# Patient Record
Sex: MALE | Race: WHITE | Employment: UNEMPLOYED | URBAN - METROPOLITAN AREA
[De-identification: names, ages, dates, MRNs, and addresses within clinical notes are randomized per-mention and may not be internally consistent; named-entity substitution may affect disease eponyms.]

---

## 2019-10-26 ENCOUNTER — HOSPITAL ENCOUNTER (EMERGENCY)
Age: 49
Discharge: HOME | End: 2019-10-26
Attending: EMERGENCY MEDICINE
Payer: MEDICARE

## 2019-10-26 VITALS
HEART RATE: 80 BPM | WEIGHT: 249 LBS | SYSTOLIC BLOOD PRESSURE: 129 MMHG | TEMPERATURE: 98.9 F | HEIGHT: 66 IN | DIASTOLIC BLOOD PRESSURE: 79 MMHG | BODY MASS INDEX: 40.02 KG/M2 | OXYGEN SATURATION: 96 % | RESPIRATION RATE: 16 BRPM

## 2019-10-26 DIAGNOSIS — R04.0 EPISTAXIS: ICD-10-CM

## 2019-10-26 DIAGNOSIS — R51.9 ACUTE NONINTRACTABLE HEADACHE, UNSPECIFIED HEADACHE TYPE: Primary | ICD-10-CM

## 2019-10-26 LAB
ALBUMIN SERPL-MCNC: 3.7 G/DL (ref 3.5–5)
ALBUMIN/GLOB SERPL: 0.9 {RATIO} (ref 1.2–3.5)
ALP SERPL-CCNC: 118 U/L (ref 50–136)
ALT SERPL-CCNC: 52 U/L (ref 12–65)
ANION GAP SERPL CALC-SCNC: 9 MMOL/L (ref 7–16)
AST SERPL-CCNC: 52 U/L (ref 15–37)
BASOPHILS # BLD: 0.2 K/UL (ref 0–0.2)
BASOPHILS NFR BLD: 1 % (ref 0–2)
BILIRUB SERPL-MCNC: 0.6 MG/DL (ref 0.2–1.1)
BUN SERPL-MCNC: 8 MG/DL (ref 6–23)
CALCIUM SERPL-MCNC: 9.4 MG/DL (ref 8.3–10.4)
CHLORIDE SERPL-SCNC: 106 MMOL/L (ref 98–107)
CO2 SERPL-SCNC: 25 MMOL/L (ref 21–32)
CREAT SERPL-MCNC: 1.19 MG/DL (ref 0.8–1.5)
DIFFERENTIAL METHOD BLD: ABNORMAL
EOSINOPHIL # BLD: 0.6 K/UL (ref 0–0.8)
EOSINOPHIL NFR BLD: 4 % (ref 0.5–7.8)
ERYTHROCYTE [DISTWIDTH] IN BLOOD BY AUTOMATED COUNT: 13.9 % (ref 11.9–14.6)
GLOBULIN SER CALC-MCNC: 4.3 G/DL (ref 2.3–3.5)
GLUCOSE SERPL-MCNC: 76 MG/DL (ref 65–100)
HCT VFR BLD AUTO: 47.5 % (ref 41.1–50.3)
HGB BLD-MCNC: 15.2 G/DL (ref 13.6–17.2)
IMM GRANULOCYTES # BLD AUTO: 0.1 K/UL (ref 0–0.5)
IMM GRANULOCYTES NFR BLD AUTO: 1 % (ref 0–5)
LYMPHOCYTES # BLD: 3.9 K/UL (ref 0.5–4.6)
LYMPHOCYTES NFR BLD: 25 % (ref 13–44)
MCH RBC QN AUTO: 29.3 PG (ref 26.1–32.9)
MCHC RBC AUTO-ENTMCNC: 32 G/DL (ref 31.4–35)
MCV RBC AUTO: 91.5 FL (ref 79.6–97.8)
MONOCYTES # BLD: 1.1 K/UL (ref 0.1–1.3)
MONOCYTES NFR BLD: 7 % (ref 4–12)
NEUTS SEG # BLD: 9.6 K/UL (ref 1.7–8.2)
NEUTS SEG NFR BLD: 62 % (ref 43–78)
NRBC # BLD: 0 K/UL (ref 0–0.2)
PLATELET # BLD AUTO: 348 K/UL (ref 150–450)
PMV BLD AUTO: 9.3 FL (ref 9.4–12.3)
POTASSIUM SERPL-SCNC: 3.8 MMOL/L (ref 3.5–5.1)
PROT SERPL-MCNC: 8 G/DL (ref 6.3–8.2)
RBC # BLD AUTO: 5.19 M/UL (ref 4.23–5.6)
SODIUM SERPL-SCNC: 140 MMOL/L (ref 136–145)
WBC # BLD AUTO: 15.4 K/UL (ref 4.3–11.1)

## 2019-10-26 PROCEDURE — 85025 COMPLETE CBC W/AUTO DIFF WBC: CPT

## 2019-10-26 PROCEDURE — 99284 EMERGENCY DEPT VISIT MOD MDM: CPT | Performed by: EMERGENCY MEDICINE

## 2019-10-26 PROCEDURE — 96361 HYDRATE IV INFUSION ADD-ON: CPT | Performed by: EMERGENCY MEDICINE

## 2019-10-26 PROCEDURE — 96375 TX/PRO/DX INJ NEW DRUG ADDON: CPT | Performed by: EMERGENCY MEDICINE

## 2019-10-26 PROCEDURE — 74011250636 HC RX REV CODE- 250/636: Performed by: EMERGENCY MEDICINE

## 2019-10-26 PROCEDURE — 96374 THER/PROPH/DIAG INJ IV PUSH: CPT | Performed by: EMERGENCY MEDICINE

## 2019-10-26 PROCEDURE — 80053 COMPREHEN METABOLIC PANEL: CPT

## 2019-10-26 RX ORDER — ONDANSETRON 2 MG/ML
4 INJECTION INTRAMUSCULAR; INTRAVENOUS
Status: COMPLETED | OUTPATIENT
Start: 2019-10-26 | End: 2019-10-26

## 2019-10-26 RX ORDER — MORPHINE SULFATE 4 MG/ML
4 INJECTION INTRAVENOUS
Status: COMPLETED | OUTPATIENT
Start: 2019-10-26 | End: 2019-10-26

## 2019-10-26 RX ADMIN — MORPHINE SULFATE 4 MG: 4 INJECTION INTRAVENOUS at 19:37

## 2019-10-26 RX ADMIN — SODIUM CHLORIDE 1000 ML: 900 INJECTION, SOLUTION INTRAVENOUS at 19:37

## 2019-10-26 RX ADMIN — ONDANSETRON 4 MG: 2 INJECTION INTRAMUSCULAR; INTRAVENOUS at 19:37

## 2019-10-26 NOTE — ED PROVIDER NOTES
Patient is on Brilinta for his heart. Developed intermittent nosebleeds since 8 AM this morning. Kind of a slow drizzled per him. When the bleeding stops he develops a diffuse frontal headache. When the bleeding starts again headache improves. For the past couple days he has had some dizziness and lightheadedness with a fall. Injured his right chest wall. Had EKG and CT at that time with no acute found. Since today with the bleeding and headache. Note form [facility] follows. Harrison Harper MD - 10/25/2019 2:57 AM EDT  Formatting of this note might be different from the original.  History  Chief Complaint   Patient presents with    Shortness of Breath   Pt sent to ED by PCP. Pt report a knot in his left upper chest and difficulty taking a deep breath. HPI  Patient is a 55-year-old gentleman with a history significant for panhypopituitarism status post pituitary tumor resection, prior coronary stenting on aspirin/Brilinta presenting today for evaluation of chest pain after a fall about 5 days ago. Patient states that he had woken up in the middle the night to use the restroom\" and the next thing I knew I woke up on the floor\". He thinks that he fell and struck the right anterior side of his chest on a dresser. Since, states that has been having dull, throbbing right anterior chest wall pain that is been roughly static. He endorses persistent shortness of breath during this time, particular worse with ambulation, somewhat better with rest. States that he feels like he is unable to take a deep breath. Yesterday states that he had a coughing spell and after noted a \"knot\" in the middle of ecchymosis from his fall. Notes that he is also been having daily posterior headaches after the fall with some associated light headedness, usually does not get headaches. Was referred to the ED by his PCP for further evaluation. Has noted some chills but has not taken his temperature at home.     CT chest and head no acute.     The history is provided by the patient. No  was used. Epistaxis    This is a new problem. The current episode started 6 to 12 hours ago. The problem occurs daily. The problem has been resolved. The problem is associated with nothing. The bleeding has been from the right nare. He has tried applying pressure for the symptoms. Headache    This is a new problem. The current episode started 6 to 12 hours ago. Episode frequency: daily. The problem has not changed since onset. The headache is aggravated by nothing. The pain is located in the bilateral and frontal region. The quality of the pain is described as throbbing. The pain is moderate. Pertinent negatives include no fever, no malaise/fatigue, no chest pressure, no palpitations, no shortness of breath, no weakness, no dizziness, no visual change, no nausea and no vomiting. He has tried nothing for the symptoms. No past medical history on file. No past surgical history on file. No family history on file.     Social History     Socioeconomic History    Marital status: SINGLE     Spouse name: Not on file    Number of children: Not on file    Years of education: Not on file    Highest education level: Not on file   Occupational History    Not on file   Social Needs    Financial resource strain: Not on file    Food insecurity:     Worry: Not on file     Inability: Not on file    Transportation needs:     Medical: Not on file     Non-medical: Not on file   Tobacco Use    Smoking status: Not on file   Substance and Sexual Activity    Alcohol use: Not on file    Drug use: Not on file    Sexual activity: Not on file   Lifestyle    Physical activity:     Days per week: Not on file     Minutes per session: Not on file    Stress: Not on file   Relationships    Social connections:     Talks on phone: Not on file     Gets together: Not on file     Attends Jainism service: Not on file     Active member of club or organization: Not on file     Attends meetings of clubs or organizations: Not on file     Relationship status: Not on file    Intimate partner violence:     Fear of current or ex partner: Not on file     Emotionally abused: Not on file     Physically abused: Not on file     Forced sexual activity: Not on file   Other Topics Concern    Not on file   Social History Narrative    Not on file         ALLERGIES: Patient has no known allergies. Review of Systems   Constitutional: Negative for chills, fever and malaise/fatigue. HENT: Positive for nosebleeds. Negative for rhinorrhea and sore throat. Eyes: Negative for pain and redness. Respiratory: Negative for chest tightness, shortness of breath and wheezing. Cardiovascular: Negative for chest pain, palpitations and leg swelling. Gastrointestinal: Negative for abdominal pain, diarrhea, nausea and vomiting. Genitourinary: Negative for dysuria and hematuria. Musculoskeletal: Negative for back pain, gait problem, neck pain and neck stiffness. Skin: Negative for color change and rash. Neurological: Positive for light-headedness and headaches. Negative for dizziness, weakness and numbness. Vitals:    10/26/19 1801   BP: 121/81   Pulse: 88   Resp: 16   Temp: 99.1 °F (37.3 °C)   SpO2: 95%   Weight: 112.9 kg (249 lb)   Height: 5' 6\" (1.676 m)            Physical Exam   Constitutional: He is oriented to person, place, and time. He appears well-developed and well-nourished. HENT:   Head: Normocephalic and atraumatic. Dried blood in right nostril more than left. No active bleeding. Eyes: Pupils are equal, round, and reactive to light. EOM are normal.   Neck: Normal range of motion. Neck supple. Cardiovascular: Normal rate and regular rhythm. Pulmonary/Chest: Effort normal and breath sounds normal.   Abdominal: Soft. Bowel sounds are normal. There is no tenderness. Musculoskeletal: Normal range of motion. He exhibits no edema. Neurological: He is alert and oriented to person, place, and time. No cranial nerve deficit. He exhibits normal muscle tone. Coordination normal.   Skin: Skin is warm and dry. MDM  Number of Diagnoses or Management Options  Diagnosis management comments: Nosebleed has stopped for his entire ER visit. Headache feels better after medication here. Will discharge with ENT follow-up. Amount and/or Complexity of Data Reviewed  Clinical lab tests: ordered and reviewed  Tests in the radiology section of CPT®: reviewed  Tests in the medicine section of CPT®: ordered and reviewed    Patient Progress  Patient progress: stable         Procedures        Results Include:    Recent Results (from the past 24 hour(s))   CBC WITH AUTOMATED DIFF    Collection Time: 10/26/19  6:09 PM   Result Value Ref Range    WBC 15.4 (H) 4.3 - 11.1 K/uL    RBC 5.19 4.23 - 5.6 M/uL    HGB 15.2 13.6 - 17.2 g/dL    HCT 47.5 41.1 - 50.3 %    MCV 91.5 79.6 - 97.8 FL    MCH 29.3 26.1 - 32.9 PG    MCHC 32.0 31.4 - 35.0 g/dL    RDW 13.9 11.9 - 14.6 %    PLATELET 722 881 - 435 K/uL    MPV 9.3 (L) 9.4 - 12.3 FL    ABSOLUTE NRBC 0.00 0.0 - 0.2 K/uL    DF AUTOMATED      NEUTROPHILS 62 43 - 78 %    LYMPHOCYTES 25 13 - 44 %    MONOCYTES 7 4.0 - 12.0 %    EOSINOPHILS 4 0.5 - 7.8 %    BASOPHILS 1 0.0 - 2.0 %    IMMATURE GRANULOCYTES 1 0.0 - 5.0 %    ABS. NEUTROPHILS 9.6 (H) 1.7 - 8.2 K/UL    ABS. LYMPHOCYTES 3.9 0.5 - 4.6 K/UL    ABS. MONOCYTES 1.1 0.1 - 1.3 K/UL    ABS. EOSINOPHILS 0.6 0.0 - 0.8 K/UL    ABS. BASOPHILS 0.2 0.0 - 0.2 K/UL    ABS. IMM.  GRANS. 0.1 0.0 - 0.5 K/UL   METABOLIC PANEL, COMPREHENSIVE    Collection Time: 10/26/19  7:52 PM   Result Value Ref Range    Sodium 140 136 - 145 mmol/L    Potassium 3.8 3.5 - 5.1 mmol/L    Chloride 106 98 - 107 mmol/L    CO2 25 21 - 32 mmol/L    Anion gap 9 7 - 16 mmol/L    Glucose 76 65 - 100 mg/dL    BUN 8 6 - 23 MG/DL    Creatinine 1.19 0.8 - 1.5 MG/DL    GFR est AA >60 >60 ml/min/1.73m2    GFR est non-AA >60 >60 ml/min/1.73m2    Calcium 9.4 8.3 - 10.4 MG/DL    Bilirubin, total 0.6 0.2 - 1.1 MG/DL    ALT (SGPT) 52 12 - 65 U/L    AST (SGOT) 52 (H) 15 - 37 U/L    Alk.  phosphatase 118 50 - 136 U/L    Protein, total 8.0 6.3 - 8.2 g/dL    Albumin 3.7 3.5 - 5.0 g/dL    Globulin 4.3 (H) 2.3 - 3.5 g/dL    A-G Ratio 0.9 (L) 1.2 - 3.5

## 2019-10-26 NOTE — ED TRIAGE NOTES
Pt arrives via EMS from home with c/o epistaxis intermitent since 0800 this morning. Bleeding controlled at this time. Pt reports HA when bleeding stops and HA goes away while his nose is bleeding. /86, HR 90, . Pt is on brelinta and ASA. Pt states the bleed is a slow bleed. Pt states he has been dizzy for a few days. Pt went to the ER at [facility] and states CT and EKG was unremarkable. Pt appears anxious in triage.

## 2019-10-27 NOTE — ED NOTES
I have reviewed discharge instructions with the patient. The patient verbalized understanding. Patient left ED via Discharge Method: ambulatory to Home with friend. Opportunity for questions and clarification provided. Patient given 0 scripts. To continue your aftercare when you leave the hospital, you may receive an automated call from our care team to check in on how you are doing. This is a free service and part of our promise to provide the best care and service to meet your aftercare needs.  If you have questions, or wish to unsubscribe from this service please call [phone]. Thank you for Choosing our [facility] Emergency Department.

## 2019-10-27 NOTE — DISCHARGE INSTRUCTIONS
Patient Education        Headache: Care Instructions  Your Care Instructions    Headaches have many possible causes. Most headaches aren't a sign of a more serious problem, and they will get better on their own. Home treatment may help you feel better faster. The doctor has checked you carefully, but problems can develop later. If you notice any problems or new symptoms, get medical treatment right away. Follow-up care is a key part of your treatment and safety. Be sure to make and go to all appointments, and call your doctor if you are having problems. It's also a good idea to know your test results and keep a list of the medicines you take. How can you care for yourself at home? · Do not drive if you have taken a prescription pain medicine. · Rest in a quiet, dark room until your headache is gone. Close your eyes and try to relax or go to sleep. Don't watch TV or read. · Put a cold, moist cloth or cold pack on the painful area for 10 to 20 minutes at a time. Put a thin cloth between the cold pack and your skin. · Use a warm, moist towel or a heating pad set on low to relax tight shoulder and neck muscles. · Have someone gently massage your neck and shoulders. · Take pain medicines exactly as directed. ? If the doctor gave you a prescription medicine for pain, take it as prescribed. ? If you are not taking a prescription pain medicine, ask your doctor if you can take an over-the-counter medicine. · Be careful not to take pain medicine more often than the instructions allow, because you may get worse or more frequent headaches when the medicine wears off. · Do not ignore new symptoms that occur with a headache, such as a fever, weakness or numbness, vision changes, or confusion. These may be signs of a more serious problem. To prevent headaches  · Keep a headache diary so you can figure out what triggers your headaches. Avoiding triggers may help you prevent headaches.  Record when each headache began, how long it lasted, and what the pain was like (throbbing, aching, stabbing, or dull). Write down any other symptoms you had with the headache, such as nausea, flashing lights or dark spots, or sensitivity to bright light or loud noise. Note if the headache occurred near your period. List anything that might have triggered the headache, such as certain foods (chocolate, cheese, wine) or odors, smoke, bright light, stress, or lack of sleep. · Find healthy ways to deal with stress. Headaches are most common during or right after stressful times. Take time to relax before and after you do something that has caused a headache in the past.  · Try to keep your muscles relaxed by keeping good posture. Check your jaw, face, neck, and shoulder muscles for tension, and try relaxing them. When sitting at a desk, change positions often, and stretch for 30 seconds each hour. · Get plenty of sleep and exercise. · Eat regularly and well. Long periods without food can trigger a headache. · Treat yourself to a massage. Some people find that regular massages are very helpful in relieving tension. · Limit caffeine by not drinking too much coffee, tea, or soda. But don't quit caffeine suddenly, because that can also give you headaches. · Reduce eyestrain from computers by blinking frequently and looking away from the computer screen every so often. Make sure you have proper eyewear and that your monitor is set up properly, about an arm's length away. · Seek help if you have depression or anxiety. Your headaches may be linked to these conditions. Treatment can both prevent headaches and help with symptoms of anxiety or depression. When should you call for help? Call 911 anytime you think you may need emergency care. For example, call if:    · You have signs of a stroke. These may include:  ? Sudden numbness, paralysis, or weakness in your face, arm, or leg, especially on only one side of your body.   ? Sudden vision changes. ? Sudden trouble speaking. ? Sudden confusion or trouble understanding simple statements. ? Sudden problems with walking or balance. ? A sudden, severe headache that is different from past headaches.    Call your doctor now or seek immediate medical care if:    · You have a new or worse headache.     · Your headache gets much worse. Where can you learn more? Go to http://guru-isabella.info/. Enter M271 in the search box to learn more about \"Headache: Care Instructions. \"  Current as of: March 28, 2019  Content Version: 12.2  © 8376-1992 [employer]. Care instructions adapted under license by [employer] (which disclaims liability or warranty for this information). If you have questions about a medical condition or this instruction, always ask your healthcare professional. [place] [address] any warranty or liability for your use of this information. Patient Education        Nosebleeds: Care Instructions  Your Care Instructions    Nosebleeds are common, especially if you have colds or allergies. Many things can cause a nosebleed. Some nosebleeds stop on their own with pressure. Others need packing. Some get cauterized (sealed). If you have gauze or other packing materials in your nose, you will need to follow up with your doctor to have the packing removed. You may need more treatment if you get nosebleeds a lot. The doctor has checked you carefully, but problems can develop later. If you notice any problems or new symptoms, get medical treatment right away. Follow-up care is a key part of your treatment and safety. Be sure to make and go to all appointments, and call your doctor if you are having problems. It's also a good idea to know your test results and keep a list of the medicines you take. How can you care for yourself at home? · If you get another nosebleed:  ? Sit up and tilt your head slightly forward.  This keeps blood from going down your throat. ? Use your thumb and index finger to pinch your nose shut for 10 minutes. Use a clock. Do not check to see if the bleeding has stopped before the 10 minutes are up. If the bleeding has not stopped, pinch your nose shut for another 10 minutes. ? When the bleeding has stopped, try not to pick, rub, or blow your nose for 12 hours. Avoiding these things helps keep your nose from bleeding again. · If your doctor prescribed antibiotics, take them as directed. Do not stop taking them just because you feel better. You need to take the full course of antibiotics. To prevent nosebleeds  · Do not blow your nose too hard. · Try not to lift or strain after a nosebleed. · Raise your head on a pillow while you sleep. · Put a thin layer of a saline- or water-based nasal gel, such as NasoGel, inside your nose. Put it on the septum, which divides your nostrils. This will prevent dryness that can cause nosebleeds. · Use a vaporizer or humidifier to add moisture to your bedroom. Follow the directions for cleaning the machine. · Do not use aspirin, ibuprofen (Advil, Motrin), or naproxen (Aleve) for 36 to 48 hours after a nosebleed unless your doctor tells you to. You can use acetaminophen (Tylenol) for pain relief. · Talk to your doctor about stopping any other medicines you are taking. Some medicines may make you more likely to get a nosebleed. · Do not use cold medicines or nasal sprays without first talking to your doctor. They can make your nose dry. When should you call for help? Call 911 anytime you think you may need emergency care.  For example, call if:    · You passed out (lost consciousness).    Call your doctor now or seek immediate medical care if:    · You get another nosebleed and your nose is still bleeding after you have applied pressure 3 times for 10 minutes each time (30 minutes total).     · There is a lot of blood running down the back of your throat even after you pinch your nose and tilt your head forward.     · You have a fever.     · You have sinus pain.    Watch closely for changes in your health, and be sure to contact your doctor if:    · You get nosebleeds often, even if they stop.     · You do not get better as expected. Where can you learn more? Go to http://guru-isabella.info/. Enter S156 in the search box to learn more about \"Nosebleeds: Care Instructions. \"  Current as of: June 26, 2019  Content Version: 12.2  © 4366-7350 [employer]. Care instructions adapted under license by [employer] (which disclaims liability or warranty for this information). If you have questions about a medical condition or this instruction, always ask your healthcare professional. [address] any warranty or liability for your use of this information.